# Patient Record
Sex: FEMALE | Race: WHITE | URBAN - METROPOLITAN AREA
[De-identification: names, ages, dates, MRNs, and addresses within clinical notes are randomized per-mention and may not be internally consistent; named-entity substitution may affect disease eponyms.]

---

## 2019-10-15 ENCOUNTER — APPOINTMENT (RX ONLY)
Dept: URBAN - METROPOLITAN AREA CLINIC 17 | Facility: CLINIC | Age: 64
Setting detail: DERMATOLOGY
End: 2019-10-15

## 2019-10-15 DIAGNOSIS — L82.1 OTHER SEBORRHEIC KERATOSIS: ICD-10-CM

## 2019-10-15 PROCEDURE — ? COUNSELING

## 2019-10-15 PROCEDURE — 99202 OFFICE O/P NEW SF 15 MIN: CPT

## 2019-10-15 ASSESSMENT — LOCATION DETAILED DESCRIPTION DERM: LOCATION DETAILED: RIGHT MEDIAL BREAST 2-3:00 REGION

## 2019-10-15 ASSESSMENT — LOCATION ZONE DERM: LOCATION ZONE: TRUNK

## 2019-10-15 ASSESSMENT — LOCATION SIMPLE DESCRIPTION DERM: LOCATION SIMPLE: RIGHT BREAST

## 2020-04-06 ENCOUNTER — APPOINTMENT (RX ONLY)
Dept: URBAN - METROPOLITAN AREA CLINIC 41 | Facility: CLINIC | Age: 65
Setting detail: DERMATOLOGY
End: 2020-04-06

## 2020-04-06 DIAGNOSIS — L82.0 INFLAMED SEBORRHEIC KERATOSIS: ICD-10-CM

## 2020-04-06 DIAGNOSIS — L57.8 OTHER SKIN CHANGES DUE TO CHRONIC EXPOSURE TO NONIONIZING RADIATION: ICD-10-CM

## 2020-04-06 DIAGNOSIS — Z71.89 OTHER SPECIFIED COUNSELING: ICD-10-CM

## 2020-04-06 PROCEDURE — ? RECOMMENDATIONS

## 2020-04-06 PROCEDURE — ? TREATMENT REGIMEN

## 2020-04-06 PROCEDURE — ? PRODUCT LINE (PHARMACEUTICALS)

## 2020-04-06 PROCEDURE — 99213 OFFICE O/P EST LOW 20 MIN: CPT | Mod: 95

## 2020-04-06 PROCEDURE — ? COUNSELING

## 2020-04-06 ASSESSMENT — LOCATION ZONE DERM
LOCATION ZONE: LIP
LOCATION ZONE: FACE
LOCATION ZONE: NOSE

## 2020-04-06 ASSESSMENT — LOCATION DETAILED DESCRIPTION DERM
LOCATION DETAILED: NASAL DORSUM
LOCATION DETAILED: LEFT MID TEMPLE
LOCATION DETAILED: LEFT MEDIAL MALAR CHEEK
LOCATION DETAILED: LEFT MEDIAL FOREHEAD
LOCATION DETAILED: LEFT INFERIOR CENTRAL MALAR CHEEK
LOCATION DETAILED: LEFT LOWER CUTANEOUS LIP
LOCATION DETAILED: RIGHT INFERIOR CENTRAL MALAR CHEEK

## 2020-04-06 ASSESSMENT — LOCATION SIMPLE DESCRIPTION DERM
LOCATION SIMPLE: LEFT FOREHEAD
LOCATION SIMPLE: LEFT CHEEK
LOCATION SIMPLE: LEFT LIP
LOCATION SIMPLE: LEFT TEMPLE
LOCATION SIMPLE: NOSE
LOCATION SIMPLE: RIGHT CHEEK

## 2020-04-06 NOTE — PROCEDURE: PRODUCT LINE (PHARMACEUTICALS)
Product 35 Units: 0
Product 27 Price (In Dollars - Numeric Only, No Special Characters Or $): 0.00
Name Of Product 3: Tretinoin 0.05%
Send Charges To Patient Encounter: Yes
Product 5 Price (In Dollars - Numeric Only, No Special Characters Or $): 50.00
Name Of Product 5: Azelaic
Name Of Product 2: Rosacea Triple Gel
Detail Level: Zone
Product 1 Application Directions: Apply pea size amount to face at bedtime as tolerated
Product 1 Units: 1
Name Of Product 4: Melasma emulsion
Name Of Product 1: Tretinoin 0.05% cream
Product 3 Application Directions: Apply to face nightly as tolerated. 11 refills

## 2020-04-06 NOTE — PROCEDURE: COUNSELING
Patient Specific Counseling (Will Not Stick From Patient To Patient): When Coronavirus restrictions lifted, she may recheck in office to have treated with LN2, if desired.
Detail Level: Zone
Detail Level: Generalized
Patient Specific Counseling (Will Not Stick From Patient To Patient): When Coronavirus restrictions lifted, will bring her in for cosmetic consult on treatment of lentigos, etc.  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  Recommend SPF 30 or higher daily and reapply frequently.

## 2020-06-23 ENCOUNTER — APPOINTMENT (RX ONLY)
Dept: URBAN - METROPOLITAN AREA CLINIC 14 | Facility: CLINIC | Age: 65
Setting detail: DERMATOLOGY
End: 2020-06-23

## 2020-06-23 VITALS — TEMPERATURE: 98.3 F

## 2020-06-23 DIAGNOSIS — L82.1 OTHER SEBORRHEIC KERATOSIS: ICD-10-CM

## 2020-06-23 DIAGNOSIS — Z71.89 OTHER SPECIFIED COUNSELING: ICD-10-CM

## 2020-06-23 DIAGNOSIS — D22 MELANOCYTIC NEVI: ICD-10-CM

## 2020-06-23 DIAGNOSIS — L57.8 OTHER SKIN CHANGES DUE TO CHRONIC EXPOSURE TO NONIONIZING RADIATION: ICD-10-CM

## 2020-06-23 DIAGNOSIS — Z41.9 ENCOUNTER FOR PROCEDURE FOR PURPOSES OTHER THAN REMEDYING HEALTH STATE, UNSPECIFIED: ICD-10-CM

## 2020-06-23 PROBLEM — D23.72 OTHER BENIGN NEOPLASM OF SKIN OF LEFT LOWER LIMB, INCLUDING HIP: Status: ACTIVE | Noted: 2020-06-23

## 2020-06-23 PROBLEM — D22.5 MELANOCYTIC NEVI OF TRUNK: Status: ACTIVE | Noted: 2020-06-23

## 2020-06-23 PROBLEM — D23.71 OTHER BENIGN NEOPLASM OF SKIN OF RIGHT LOWER LIMB, INCLUDING HIP: Status: ACTIVE | Noted: 2020-06-23

## 2020-06-23 PROCEDURE — 99214 OFFICE O/P EST MOD 30 MIN: CPT

## 2020-06-23 PROCEDURE — ? RECOMMENDATIONS

## 2020-06-23 PROCEDURE — ? TREATMENT REGIMEN

## 2020-06-23 PROCEDURE — ? OTHER

## 2020-06-23 PROCEDURE — ? COUNSELING

## 2020-06-23 PROCEDURE — ? BOTOX

## 2020-06-23 PROCEDURE — ? COSMETIC CONSULTATION: FRACTIONAL RESURFACING

## 2020-06-23 ASSESSMENT — LOCATION SIMPLE DESCRIPTION DERM
LOCATION SIMPLE: LEFT UPPER BACK
LOCATION SIMPLE: LEFT FOREHEAD
LOCATION SIMPLE: LEFT FOREHEAD
LOCATION SIMPLE: RIGHT CHEEK
LOCATION SIMPLE: LEFT LIP
LOCATION SIMPLE: LEFT ANTERIOR NECK
LOCATION SIMPLE: NOSE
LOCATION SIMPLE: RIGHT FOREHEAD
LOCATION SIMPLE: LEFT CHEEK

## 2020-06-23 ASSESSMENT — LOCATION DETAILED DESCRIPTION DERM
LOCATION DETAILED: RIGHT MEDIAL FOREHEAD
LOCATION DETAILED: NASAL DORSUM
LOCATION DETAILED: RIGHT INFERIOR CENTRAL MALAR CHEEK
LOCATION DETAILED: LEFT MEDIAL FOREHEAD
LOCATION DETAILED: LEFT INFERIOR MEDIAL UPPER BACK
LOCATION DETAILED: LEFT SUPERIOR MEDIAL FOREHEAD
LOCATION DETAILED: LEFT LOWER CUTANEOUS LIP
LOCATION DETAILED: LEFT INFERIOR CENTRAL MALAR CHEEK
LOCATION DETAILED: LEFT INFERIOR ANTERIOR NECK

## 2020-06-23 ASSESSMENT — LOCATION ZONE DERM
LOCATION ZONE: NECK
LOCATION ZONE: FACE
LOCATION ZONE: NOSE
LOCATION ZONE: FACE
LOCATION ZONE: TRUNK
LOCATION ZONE: LIP

## 2020-06-23 NOTE — PROCEDURE: OTHER
Note Text (......Xxx Chief Complaint.): This diagnosis correlates with the
Other (Free Text): Quoted $900/treatment of full face, may need multiple treatments.  Pre and post handouts on Fraxel given.
Detail Level: Simple

## 2020-06-23 NOTE — PROCEDURE: COUNSELING
Detail Level: Generalized
Patient Specific Counseling (Will Not Stick From Patient To Patient): When Coronavirus restrictions lifted, will bring her in for cosmetic consult on treatment of lentigos, etc.  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  Recommend SPF 30 or higher daily and reapply frequently.
Detail Level: Zone

## 2020-06-23 NOTE — PROCEDURE: BOTOX
Additional Area 3 Units: 0
Show Nasal Units: Yes
Post-Care Instructions: She will recheck in 2 weeks or longer if she desires additional Botox treatments.
Show Right And Left Periorbital Units: No
Additional Area 1 Location: chin
Glabellar Complex Units: 20
Detail Level: Zone
Lot #: [id number]
Consent: Written consent obtained. Risks include but not limited to lid/brow ptosis, bruising, swelling, diplopia temporary effect, incomplete chemical denervation.  Do not lay flat for four hours after and recheck in 2 weeks if needed.
Expiration Date (Month Year): 10/2022
Dilution (U/0.1 Cc): 10